# Patient Record
Sex: FEMALE | Race: OTHER | Employment: OTHER | URBAN - METROPOLITAN AREA
[De-identification: names, ages, dates, MRNs, and addresses within clinical notes are randomized per-mention and may not be internally consistent; named-entity substitution may affect disease eponyms.]

---

## 2018-05-03 PROBLEM — N81.89 FEMALE PERINEAL LAXITY: Status: ACTIVE | Noted: 2018-05-03

## 2018-05-08 PROBLEM — Z13.1 SCREENING FOR DIABETES MELLITUS: Status: ACTIVE | Noted: 2018-05-08

## 2018-05-08 PROBLEM — G47.00 INSOMNIA, UNSPECIFIED TYPE: Status: ACTIVE | Noted: 2018-05-08

## 2018-05-08 PROBLEM — Z12.4 SCREENING FOR CERVICAL CANCER: Status: ACTIVE | Noted: 2018-05-08

## 2018-05-08 PROBLEM — F98.8 ATTENTION DEFICIT DISORDER (ADD) WITHOUT HYPERACTIVITY: Status: ACTIVE | Noted: 2018-05-08

## 2018-05-08 PROBLEM — Z00.00 PREVENTATIVE HEALTH CARE: Status: ACTIVE | Noted: 2018-05-08

## 2018-05-08 PROBLEM — Z13.220 SCREENING CHOLESTEROL LEVEL: Status: ACTIVE | Noted: 2018-05-08

## 2018-05-08 PROCEDURE — 81001 URINALYSIS AUTO W/SCOPE: CPT | Performed by: INTERNAL MEDICINE

## 2018-11-20 ENCOUNTER — HOSPITAL ENCOUNTER (OUTPATIENT)
Age: 33
Discharge: HOME | End: 2018-11-20
Attending: FAMILY MEDICINE
Payer: COMMERCIAL

## 2018-11-20 VITALS
HEART RATE: 89 BPM | HEIGHT: 60 IN | OXYGEN SATURATION: 100 % | WEIGHT: 113 LBS | BODY MASS INDEX: 22.19 KG/M2 | DIASTOLIC BLOOD PRESSURE: 75 MMHG | TEMPERATURE: 99 F | RESPIRATION RATE: 16 BRPM | SYSTOLIC BLOOD PRESSURE: 136 MMHG

## 2018-11-20 DIAGNOSIS — J03.00 STREPTOCOCCAL TONSILLOPHARYNGITIS: Primary | ICD-10-CM

## 2018-11-20 PROCEDURE — 87430 STREP A AG IA: CPT | Performed by: FAMILY MEDICINE

## 2018-11-20 PROCEDURE — 99203 OFFICE O/P NEW LOW 30 MIN: CPT

## 2018-11-20 PROCEDURE — 99204 OFFICE O/P NEW MOD 45 MIN: CPT

## 2018-11-20 RX ORDER — AMOXICILLIN AND CLAVULANATE POTASSIUM 875; 125 MG/1; MG/1
1 TABLET, FILM COATED ORAL 2 TIMES DAILY
Qty: 20 TABLET | Refills: 0 | Status: SHIPPED | OUTPATIENT
Start: 2018-11-20 | End: 2018-11-30

## 2018-11-20 RX ORDER — IBUPROFEN 600 MG/1
600 TABLET ORAL ONCE
Status: COMPLETED | OUTPATIENT
Start: 2018-11-20 | End: 2018-11-20

## 2018-11-21 NOTE — ED PROVIDER NOTES
Patient Seen in: [id number] [facility]    History   Patient presents with:  Headache (neurologic)  Fever (infectious)  Eye Problem    Stated Complaint: Headache,R.Eye Pain,Fever,Fatigue (Exposure to Strep)    HPI  This is a 75-year-old female c 22.07 kg/m²     Physical Exam  GEN: Not in any acute distress, making good conversation, answering appropriately   SKIN: No pallor, no erythema, no cyanosis, warm and dry  Eyes: wnl, normal conjunctiva, PERRL , EOMI +    HEAD: Normocephalic, atraumatic  EE medications    Amoxicillin-Pot Clavulanate (AUGMENTIN) 875-125 MG Oral Tab  Take 1 tablet by mouth 2 (two) times daily for 10 days. , Normal, Disp-20 tablet, R-0

## 2018-11-21 NOTE — ED INITIAL ASSESSMENT (HPI)
Pt sts 2 days ago began feeling very tired, felt warm- didn't check temp. Pain to right eye/forehead, tightness/pressure HA, enlarged lymph node, sore throat. Daughter with strep last week.

## 2018-12-13 PROBLEM — J02.9 SORE THROAT: Status: ACTIVE | Noted: 2018-12-13

## 2018-12-13 PROBLEM — J02.9 ACUTE PHARYNGITIS, UNSPECIFIED ETIOLOGY: Status: ACTIVE | Noted: 2018-12-13

## 2018-12-13 PROBLEM — R05.9 COUGH: Status: ACTIVE | Noted: 2018-12-13

## 2018-12-14 PROBLEM — Z23 NEED FOR IMMUNIZATION AGAINST INFLUENZA: Status: ACTIVE | Noted: 2018-12-14

## 2019-01-14 PROBLEM — R10.814 LEFT LOWER QUADRANT ABDOMINAL TENDERNESS WITHOUT REBOUND TENDERNESS: Status: ACTIVE | Noted: 2019-01-14

## 2019-01-31 PROBLEM — R10.812 LEFT UPPER QUADRANT ABDOMINAL TENDERNESS WITHOUT REBOUND TENDERNESS: Status: ACTIVE | Noted: 2019-01-31

## 2019-01-31 PROBLEM — M54.2 NECK PAIN: Status: ACTIVE | Noted: 2019-01-31

## 2019-01-31 PROBLEM — K52.9 GASTROENTERITIS: Status: ACTIVE | Noted: 2019-01-31

## 2019-01-31 PROBLEM — M62.838 TRAPEZIUS MUSCLE SPASM: Status: ACTIVE | Noted: 2019-01-31

## 2019-01-31 PROBLEM — R20.2 PARESTHESIAS: Status: ACTIVE | Noted: 2019-01-31

## 2019-01-31 PROCEDURE — 81003 URINALYSIS AUTO W/O SCOPE: CPT | Performed by: INTERNAL MEDICINE

## 2019-04-15 PROBLEM — R41.840 SHORT ATTENTION SPAN: Status: ACTIVE | Noted: 2019-04-15

## 2019-04-15 PROBLEM — K21.9 GASTROESOPHAGEAL REFLUX DISEASE WITHOUT ESOPHAGITIS: Status: ACTIVE | Noted: 2019-04-15

## 2019-04-15 PROBLEM — Z13.220 SCREENING CHOLESTEROL LEVEL: Status: RESOLVED | Noted: 2018-05-08 | Resolved: 2019-04-15

## 2019-04-15 PROBLEM — Z00.00 PREVENTATIVE HEALTH CARE: Status: RESOLVED | Noted: 2018-05-08 | Resolved: 2019-04-15

## 2019-04-15 PROBLEM — J02.9 SORE THROAT: Status: RESOLVED | Noted: 2018-12-13 | Resolved: 2019-04-15

## 2019-04-15 PROBLEM — Z12.4 SCREENING FOR CERVICAL CANCER: Status: RESOLVED | Noted: 2018-05-08 | Resolved: 2019-04-15

## 2019-04-15 PROBLEM — Z13.1 SCREENING FOR DIABETES MELLITUS: Status: RESOLVED | Noted: 2018-05-08 | Resolved: 2019-04-15

## 2019-04-15 PROBLEM — K52.9 GASTROENTERITIS: Status: RESOLVED | Noted: 2019-01-31 | Resolved: 2019-04-15

## 2019-04-15 PROBLEM — R05.9 COUGH: Status: RESOLVED | Noted: 2018-12-13 | Resolved: 2019-04-15

## 2019-09-19 ENCOUNTER — TELEPHONE (OUTPATIENT)
Dept: SCHEDULING | Age: 34
End: 2019-09-19

## 2019-09-19 PROBLEM — N63.0 SUBCUTANEOUS NODULE OF BREAST: Status: ACTIVE | Noted: 2019-09-19

## 2019-09-19 PROBLEM — Z98.82 S/P BILATERAL BREAST IMPLANTS: Status: ACTIVE | Noted: 2019-09-19

## 2019-09-21 PROBLEM — Z12.39 SCREENING FOR BREAST CANCER: Status: ACTIVE | Noted: 2019-09-21

## 2019-09-21 PROBLEM — Z12.4 CERVICAL CANCER SCREENING: Status: ACTIVE | Noted: 2019-09-21

## 2019-11-15 PROBLEM — N63.0 SUBCUTANEOUS NODULE OF BREAST: Status: RESOLVED | Noted: 2019-09-19 | Resolved: 2019-11-15

## 2019-11-15 PROBLEM — G47.00 INSOMNIA, UNSPECIFIED TYPE: Status: RESOLVED | Noted: 2018-05-08 | Resolved: 2019-11-15

## 2019-11-15 PROBLEM — R10.812 LEFT UPPER QUADRANT ABDOMINAL TENDERNESS WITHOUT REBOUND TENDERNESS: Status: RESOLVED | Noted: 2019-01-31 | Resolved: 2019-11-15

## 2019-11-15 PROBLEM — M54.2 NECK PAIN: Status: RESOLVED | Noted: 2019-01-31 | Resolved: 2019-11-15

## 2019-11-15 PROBLEM — N81.89 FEMALE PERINEAL LAXITY: Status: RESOLVED | Noted: 2018-05-03 | Resolved: 2019-11-15

## 2019-11-15 PROBLEM — N92.0 MENORRHAGIA: Status: ACTIVE | Noted: 2019-11-15

## 2019-11-15 PROBLEM — Z98.82 S/P BILATERAL BREAST IMPLANTS: Status: RESOLVED | Noted: 2019-09-19 | Resolved: 2019-11-15

## 2019-11-15 PROBLEM — J02.9 ACUTE PHARYNGITIS, UNSPECIFIED ETIOLOGY: Status: RESOLVED | Noted: 2018-12-13 | Resolved: 2019-11-15

## 2019-11-15 PROBLEM — Z12.39 SCREENING FOR BREAST CANCER: Status: RESOLVED | Noted: 2019-09-21 | Resolved: 2019-11-15

## 2019-11-15 PROBLEM — Z12.4 CERVICAL CANCER SCREENING: Status: RESOLVED | Noted: 2019-09-21 | Resolved: 2019-11-15

## 2019-11-15 PROBLEM — M62.838 TRAPEZIUS MUSCLE SPASM: Status: RESOLVED | Noted: 2019-01-31 | Resolved: 2019-11-15

## 2019-11-15 PROBLEM — K21.9 GASTROESOPHAGEAL REFLUX DISEASE WITHOUT ESOPHAGITIS: Status: RESOLVED | Noted: 2019-04-15 | Resolved: 2019-11-15

## 2019-11-15 PROBLEM — R20.2 PARESTHESIAS: Status: RESOLVED | Noted: 2019-01-31 | Resolved: 2019-11-15

## 2019-11-15 PROBLEM — Z23 NEED FOR IMMUNIZATION AGAINST INFLUENZA: Status: RESOLVED | Noted: 2018-12-14 | Resolved: 2019-11-15

## 2019-11-15 PROBLEM — R41.840 SHORT ATTENTION SPAN: Status: RESOLVED | Noted: 2019-04-15 | Resolved: 2019-11-15

## 2019-11-15 PROBLEM — F98.8 ATTENTION DEFICIT DISORDER (ADD) WITHOUT HYPERACTIVITY: Status: RESOLVED | Noted: 2018-05-08 | Resolved: 2019-11-15

## 2019-11-15 PROBLEM — R10.814 LEFT LOWER QUADRANT ABDOMINAL TENDERNESS WITHOUT REBOUND TENDERNESS: Status: RESOLVED | Noted: 2019-01-14 | Resolved: 2019-11-15

## 2019-12-16 PROBLEM — R09.81 NASAL CONGESTION: Status: ACTIVE | Noted: 2019-12-16

## 2019-12-16 PROBLEM — R05.9 COUGH: Status: ACTIVE | Noted: 2019-12-16

## 2019-12-16 PROBLEM — J01.00 ACUTE NON-RECURRENT MAXILLARY SINUSITIS: Status: ACTIVE | Noted: 2019-12-16

## 2020-02-28 PROBLEM — R41.840 SHORT ATTENTION SPAN: Status: ACTIVE | Noted: 2020-02-28

## 2020-02-28 PROBLEM — J01.00 ACUTE NON-RECURRENT MAXILLARY SINUSITIS: Status: RESOLVED | Noted: 2019-12-16 | Resolved: 2020-02-28

## 2020-02-28 PROBLEM — G47.00 INSOMNIA, UNSPECIFIED TYPE: Status: ACTIVE | Noted: 2020-02-28

## 2020-02-28 PROBLEM — M62.830 BACK MUSCLE SPASM: Status: ACTIVE | Noted: 2020-02-28

## 2020-02-28 PROBLEM — M54.2 NECK PAIN: Status: RESOLVED | Noted: 2019-01-31 | Resolved: 2020-02-28

## 2020-02-28 PROBLEM — R09.81 NASAL CONGESTION: Status: RESOLVED | Noted: 2019-12-16 | Resolved: 2020-02-28

## 2020-02-28 PROBLEM — M54.50 CHRONIC BILATERAL LOW BACK PAIN WITHOUT SCIATICA: Status: ACTIVE | Noted: 2020-02-28

## 2020-02-28 PROBLEM — Z23 NEED FOR IMMUNIZATION AGAINST INFLUENZA: Status: ACTIVE | Noted: 2020-02-28

## 2020-02-28 PROBLEM — G89.29 CHRONIC BILATERAL LOW BACK PAIN WITHOUT SCIATICA: Status: ACTIVE | Noted: 2020-02-28

## 2020-05-15 PROBLEM — Z12.4 CERVICAL CANCER SCREENING: Status: ACTIVE | Noted: 2020-05-15

## 2020-05-15 PROBLEM — Z00.00 ANNUAL PHYSICAL EXAM: Status: ACTIVE | Noted: 2020-05-15

## 2020-05-15 PROBLEM — Z00.00 PREVENTATIVE HEALTH CARE: Status: ACTIVE | Noted: 2020-05-15

## 2020-05-15 PROBLEM — G43.009 MIGRAINE WITHOUT AURA AND WITHOUT STATUS MIGRAINOSUS, NOT INTRACTABLE: Status: ACTIVE | Noted: 2020-05-15

## 2020-05-15 PROBLEM — Z13.228 SCREENING FOR METABOLIC DISORDER: Status: ACTIVE | Noted: 2020-05-15

## 2020-06-08 ENCOUNTER — HOSPITAL ENCOUNTER (OUTPATIENT)
Dept: GENERAL RADIOLOGY | Age: 35
Discharge: HOME | End: 2020-06-08
Attending: INTERNAL MEDICINE
Payer: COMMERCIAL

## 2020-06-08 DIAGNOSIS — M54.2 NECK PAIN: ICD-10-CM

## 2020-06-08 DIAGNOSIS — M62.838 TRAPEZIUS MUSCLE SPASM: ICD-10-CM

## 2020-06-08 PROCEDURE — 72050 X-RAY EXAM NECK SPINE 4/5VWS: CPT | Performed by: INTERNAL MEDICINE

## 2020-11-11 PROBLEM — Z20.822 EXPOSURE TO COVID-19 VIRUS: Status: ACTIVE | Noted: 2020-11-11

## 2020-11-11 PROBLEM — Z11.9 ENCOUNTER FOR SCREENING EXAMINATION FOR INFECTIOUS DISEASE: Status: ACTIVE | Noted: 2020-11-11

## 2021-06-09 PROBLEM — R45.86 EMOTIONAL LABILITY: Status: ACTIVE | Noted: 2021-06-09

## 2021-06-09 PROBLEM — R79.89 LOW VITAMIN D LEVEL: Status: ACTIVE | Noted: 2021-06-09

## 2025-03-28 ENCOUNTER — HOSPITAL ENCOUNTER (EMERGENCY)
Age: 40
Discharge: HOME | End: 2025-03-28
Attending: EMERGENCY MEDICINE
Payer: COMMERCIAL

## 2025-03-28 ENCOUNTER — APPOINTMENT (OUTPATIENT)
Dept: CT IMAGING | Age: 40
End: 2025-03-28
Attending: EMERGENCY MEDICINE
Payer: COMMERCIAL

## 2025-03-28 VITALS
DIASTOLIC BLOOD PRESSURE: 83 MMHG | BODY MASS INDEX: 24.54 KG/M2 | RESPIRATION RATE: 17 BRPM | WEIGHT: 125 LBS | OXYGEN SATURATION: 99 % | HEART RATE: 94 BPM | TEMPERATURE: 98 F | SYSTOLIC BLOOD PRESSURE: 142 MMHG | HEIGHT: 60 IN

## 2025-03-28 DIAGNOSIS — S09.90XA CLOSED HEAD INJURY, INITIAL ENCOUNTER: Primary | ICD-10-CM

## 2025-03-28 PROCEDURE — 70450 CT HEAD/BRAIN W/O DYE: CPT | Performed by: EMERGENCY MEDICINE

## 2025-03-28 PROCEDURE — 99284 EMERGENCY DEPT VISIT MOD MDM: CPT

## 2025-03-28 NOTE — ED INITIAL ASSESSMENT (HPI)
Pt tripped and fell 2x weeks ago, rpt she fell on concrete, has had on-going headache, vision changes

## 2025-03-28 NOTE — DISCHARGE INSTRUCTIONS
You were seen in the emergency department with a headache.  We suspect that you most likely have a concussion.  Your CT scan was normal.  We recommend that you continue to alternate between Tylenol and ibuprofen as needed for pain.  Make sure to increase your fluid intake.  Return to the ER if develop worsening pain,  speech or vision changes, focal weakness or numbness or any other new concerns or worsening symptoms.  Please follow-up closely with your primary care physician.

## 2025-03-28 NOTE — ED PROVIDER NOTES
Patient Seen in: [facility] [place]      History     Chief Complaint   Patient presents with    Headache     Stated Complaint: Pt fell 2x weeks ago and c/o headache & blurry vision    Subjective:   HPI  Patient is a 39-year-old female who presents to the ED for evaluation of headache after mechanical fall 2 weeks ago.  Patient states that she tripped on multiple stairs and hit the left side of her head.  She is unsure if she lost consciousness but her kids told her that she lost consciousness.  Patient did not come to the hospital that time.  Since then, she has had headaches in the left side of her head, intermittent blurry vision.  She has taken Tylenol with no significant improvement.  She denies any other injuries.  She reports that she had a couple episodes of vomiting a few days ago but none since then.  Reports that she has some tingling sensation in her left upper extremity and her right upper extremity feels hot.  Patient came to the ED tonight because her symptoms have not been getting any better.    Objective:     Past Medical History:    ADHD (attention deficit hyperactivity disorder)    Cervical Papanicolaou smear negative within last 12 months    WNL    H/O mammogram    Never    HPV (human papilloma virus) infection    Migraine              Past Surgical History:   Procedure Laterality Date     25-28wks fetal afua      Anesth,vaginal delivery      Autopsy gross,macerated stillborn      Other surgical history  1841    Breast Augmentation                Social History     Socioeconomic History    Marital status: Single   Tobacco Use    Smoking status: Never    Smokeless tobacco: Never   Vaping Use    Vaping status: Never Used   Substance and Sexual Activity    Alcohol use: Yes     Comment: Soc    Drug use: No    Sexual activity: Yes     Partners: Male     Birth control/protection: Vasectomy   Other Topics Concern    Seat Belt Yes    Self-Exams Yes     Social Drivers  [facility]      Received from [facility]    Housing Stability                  Physical Exam     ED Triage Vitals [03/28/25 0217]   /84   Pulse 102   Resp 20   Temp 97.6 °F (36.4 °C)   Temp src Oral   SpO2 100 %   O2 Device None (Room air)       Current Vitals:   Vital Signs  BP: 142/84  Pulse: 102  Resp: 20  Temp: 97.6 °F (36.4 °C)  Temp src: Oral    Oxygen Therapy  SpO2: 100 %  O2 Device: None (Room air)        Physical Exam  Vitals and nursing note reviewed.   Constitutional:       General: She is not in acute distress.     Appearance: She is not ill-appearing.   HENT:      Head: Normocephalic and atraumatic.      Comments: No obvious hematomas     Ears:      Comments: No hemotympanum     Mouth/Throat:      Mouth: Mucous membranes are moist.   Eyes:      Extraocular Movements: Extraocular movements intact.      Pupils: Pupils are equal, round, and reactive to light.   Cardiovascular:      Rate and Rhythm: Normal rate and regular rhythm.   Pulmonary:      Effort: Pulmonary effort is normal.   Abdominal:      General: There is no distension.      Palpations: Abdomen is soft.      Tenderness: There is no abdominal tenderness.   Musculoskeletal:         General: No signs of injury.      Cervical back: Neck supple.      Right lower leg: No edema.      Left lower leg: No edema.      Comments: No midline tenderness to palpation of spine.   Skin:     General: Skin is warm and dry.      Capillary Refill: Capillary refill takes less than 2 seconds.   Neurological:      General: No focal deficit present.      Mental Status: She is alert and oriented to person, place, and time.      Comments: 5/5 strength in UE and LE bilaterally  Normal sensation  CN II-XII in tact  No pronator drift       Psychiatric:         Mood and Affect: Mood normal.             ED Course   Labs Reviewed - No data to display    ED Course as of 03/28/25 0359  ------------------------------------------------------------  Time:  03/28 0358  Comment: CT head independently reviewed which shows no acute ICH.  Patient reevaluated and remains nontoxic-appearing.  She still declines any pain medications.  At this time patient is here for discharge home.  I discussed supportive care measures, strict return precautions.  Patient verbalized understanding and agreement of plan.  I recommended close follow-up with PCP.          MDM      Patient is a 39-year-old female who presents to the ED for evaluation of headache after mechanical fall 2 weeks ago.  Patient is afebrile, hemodynamically stable, satting well on room air.  On exam patient is very well-appearing.  She is neurologically intact.  No midline tenderness of spine. Differential diagnosis includes not limited to ICH, fracture, concussion. Plan for CTH. Pt declines any pain meds.      ED Course as of 03/28/25 0400  ------------------------------------------------------------  Time: 03/28 0358  Comment: CT head independently reviewed which shows no acute ICH.  Patient reevaluated and remains nontoxic-appearing.  She still declines any pain medications.  At this time patient is here for discharge home.  I discussed supportive care measures, strict return precautions.  Patient verbalized understanding and agreement of plan.  I recommended close follow-up with PCP.         Medical Decision Making  Amount and/or Complexity of Data Reviewed  Radiology: ordered and independent interpretation performed. Decision-making details documented in ED Course.        Disposition and Plan     Clinical Impression:  1. Closed head injury, initial encounter         Disposition:  Discharge  3/28/2025  3:47 am    Follow-up:  Osmani James MD  [address]  [address]  [facility] [address]  [phone]    Schedule an appointment as soon as possible for a visit in 1 day(s)            Medications Prescribed:  Current Discharge Medication List              Supplementary Documentation: